# Patient Record
Sex: FEMALE | Race: WHITE
[De-identification: names, ages, dates, MRNs, and addresses within clinical notes are randomized per-mention and may not be internally consistent; named-entity substitution may affect disease eponyms.]

---

## 2019-04-27 ENCOUNTER — HOSPITAL ENCOUNTER (INPATIENT)
Dept: HOSPITAL 26 - MED | Age: 39
LOS: 1 days | Discharge: HOME | DRG: 532 | End: 2019-04-28
Attending: GENERAL PRACTICE | Admitting: GENERAL PRACTICE
Payer: MEDICAID

## 2019-04-27 VITALS — WEIGHT: 208 LBS | HEIGHT: 62 IN | BODY MASS INDEX: 38.28 KG/M2

## 2019-04-27 VITALS — SYSTOLIC BLOOD PRESSURE: 138 MMHG | DIASTOLIC BLOOD PRESSURE: 93 MMHG

## 2019-04-27 VITALS — SYSTOLIC BLOOD PRESSURE: 138 MMHG | DIASTOLIC BLOOD PRESSURE: 83 MMHG

## 2019-04-27 VITALS — SYSTOLIC BLOOD PRESSURE: 144 MMHG | DIASTOLIC BLOOD PRESSURE: 86 MMHG

## 2019-04-27 VITALS — SYSTOLIC BLOOD PRESSURE: 150 MMHG | DIASTOLIC BLOOD PRESSURE: 87 MMHG

## 2019-04-27 VITALS — DIASTOLIC BLOOD PRESSURE: 91 MMHG | SYSTOLIC BLOOD PRESSURE: 148 MMHG

## 2019-04-27 DIAGNOSIS — N92.0: Primary | ICD-10-CM

## 2019-04-27 DIAGNOSIS — E86.1: ICD-10-CM

## 2019-04-27 DIAGNOSIS — N17.0: ICD-10-CM

## 2019-04-27 DIAGNOSIS — D64.9: ICD-10-CM

## 2019-04-27 DIAGNOSIS — J98.11: ICD-10-CM

## 2019-04-27 DIAGNOSIS — I10: ICD-10-CM

## 2019-04-27 DIAGNOSIS — R00.0: ICD-10-CM

## 2019-04-27 DIAGNOSIS — E44.0: ICD-10-CM

## 2019-04-27 LAB
ALBUMIN FLD-MCNC: 3 G/DL (ref 3.4–5)
ANION GAP SERPL CALCULATED.3IONS-SCNC: 11.1 MMOL/L (ref 8–16)
ANION GAP SERPL CALCULATED.3IONS-SCNC: 11.4 MMOL/L (ref 8–16)
APPEARANCE UR: (no result)
AST SERPL-CCNC: 20 U/L (ref 15–37)
BARBITURATES UR QL SCN: (no result) NG/ML
BASOPHILS # BLD AUTO: 0.1 K/UL (ref 0–0.22)
BASOPHILS NFR BLD AUTO: 0.6 % (ref 0–2)
BASOPHILS NFR BLD AUTO: 0.9 % (ref 0–2)
BASOPHILS NFR BLD AUTO: 1 % (ref 0–2)
BENZODIAZ UR QL SCN: (no result) NG/ML
BILIRUB SERPL-MCNC: 0.1 MG/DL (ref 0–1)
BILIRUB UR QL STRIP: NEGATIVE
BUN SERPL-MCNC: 18 MG/DL (ref 7–18)
BUN SERPL-MCNC: 22 MG/DL (ref 7–18)
BZE UR QL SCN: (no result) NG/ML
CANNABINOIDS UR QL SCN: (no result) NG/ML
CHLORIDE SERPL-SCNC: 106 MMOL/L (ref 98–107)
CHLORIDE SERPL-SCNC: 108 MMOL/L (ref 98–107)
CHOLEST/HDLC SERPL: 2.9 {RATIO} (ref 1–4.5)
CO2 SERPL-SCNC: 26.3 MMOL/L (ref 21–32)
CO2 SERPL-SCNC: 26.6 MMOL/L (ref 21–32)
COLOR UR: YELLOW
CREAT SERPL-MCNC: 0.7 MG/DL (ref 0.6–1.3)
CREAT SERPL-MCNC: 1 MG/DL (ref 0.6–1.3)
EOSINOPHIL # BLD AUTO: 0.6 K/UL (ref 0–0.4)
EOSINOPHIL # BLD AUTO: 0.7 K/UL (ref 0–0.4)
EOSINOPHIL # BLD AUTO: 0.8 K/UL (ref 0–0.4)
EOSINOPHIL NFR BLD AUTO: 5.8 % (ref 0–4)
EOSINOPHIL NFR BLD AUTO: 7.6 % (ref 0–4)
EOSINOPHIL NFR BLD AUTO: 7.6 % (ref 0–4)
ERYTHROCYTE [DISTWIDTH] IN BLOOD BY AUTOMATED COUNT: 20.5 % (ref 11.6–13.7)
ERYTHROCYTE [DISTWIDTH] IN BLOOD BY AUTOMATED COUNT: 20.9 % (ref 11.6–13.7)
ERYTHROCYTE [DISTWIDTH] IN BLOOD BY AUTOMATED COUNT: 21.7 % (ref 11.6–13.7)
GFR SERPL CREATININE-BSD FRML MDRD: 120 ML/MIN (ref 90–?)
GFR SERPL CREATININE-BSD FRML MDRD: 79 ML/MIN (ref 90–?)
GLUCOSE SERPL-MCNC: 103 MG/DL (ref 74–106)
GLUCOSE SERPL-MCNC: 108 MG/DL (ref 74–106)
GLUCOSE UR STRIP-MCNC: NEGATIVE MG/DL
HCT VFR BLD AUTO: 20.9 % (ref 36–48)
HCT VFR BLD AUTO: 21.9 % (ref 36–48)
HCT VFR BLD AUTO: 26.3 % (ref 36–48)
HDLC SERPL-MCNC: 42 MG/DL (ref 40–60)
HGB BLD-MCNC: 6.4 G/DL (ref 12–16)
HGB BLD-MCNC: 6.8 G/DL (ref 12–16)
HGB BLD-MCNC: 8.3 G/DL (ref 12–16)
HGB UR QL STRIP: (no result)
IRON SERPL-MCNC: 16 UG/DL (ref 35–150)
LDLC SERPL CALC-MCNC: 65 MG/DL (ref 60–100)
LEUKOCYTE ESTERASE UR QL STRIP: (no result)
LIPASE SERPL-CCNC: 164 U/L (ref 73–393)
LYMPHOCYTES # BLD AUTO: 1.9 K/UL (ref 2.5–16.5)
LYMPHOCYTES # BLD AUTO: 2.3 K/UL (ref 2.5–16.5)
LYMPHOCYTES # BLD AUTO: 2.5 K/UL (ref 2.5–16.5)
LYMPHOCYTES NFR BLD AUTO: 17.7 % (ref 20.5–51.1)
LYMPHOCYTES NFR BLD AUTO: 23.9 % (ref 20.5–51.1)
LYMPHOCYTES NFR BLD AUTO: 26.2 % (ref 20.5–51.1)
MAGNESIUM SERPL-MCNC: 2.2 MG/DL (ref 1.8–2.4)
MCH RBC QN AUTO: 24 PG (ref 27–31)
MCH RBC QN AUTO: 25 PG (ref 27–31)
MCH RBC QN AUTO: 25 PG (ref 27–31)
MCHC RBC AUTO-ENTMCNC: 31 G/DL (ref 33–37)
MCHC RBC AUTO-ENTMCNC: 31 G/DL (ref 33–37)
MCHC RBC AUTO-ENTMCNC: 32 G/DL (ref 33–37)
MCV RBC AUTO: 78.2 FL (ref 80–94)
MCV RBC AUTO: 80.3 FL (ref 80–94)
MCV RBC AUTO: 80.4 FL (ref 80–94)
MONOCYTES # BLD AUTO: 0.8 K/UL (ref 0.8–1)
MONOCYTES # BLD AUTO: 0.9 K/UL (ref 0.8–1)
MONOCYTES # BLD AUTO: 0.9 K/UL (ref 0.8–1)
MONOCYTES NFR BLD AUTO: 8.6 % (ref 1.7–9.3)
MONOCYTES NFR BLD AUTO: 8.6 % (ref 1.7–9.3)
MONOCYTES NFR BLD AUTO: 9.6 % (ref 1.7–9.3)
NEUTROPHILS # BLD AUTO: 4.8 K/UL (ref 1.8–7.7)
NEUTROPHILS # BLD AUTO: 6.3 K/UL (ref 1.8–7.7)
NEUTROPHILS # BLD AUTO: 6.9 K/UL (ref 1.8–7.7)
NEUTROPHILS NFR BLD AUTO: 56 % (ref 42.2–75.2)
NEUTROPHILS NFR BLD AUTO: 60.8 % (ref 42.2–75.2)
NEUTROPHILS NFR BLD AUTO: 65.1 % (ref 42.2–75.2)
NITRITE UR QL STRIP: NEGATIVE
OPIATES UR QL SCN: (no result) NG/ML
PCP UR QL SCN: (no result) NG/ML
PH UR STRIP: 5.5 [PH] (ref 5–9)
PHOSPHATE SERPL-MCNC: 3.5 MG/DL (ref 2.5–4.9)
PLATELET # BLD AUTO: 352 K/UL (ref 140–450)
PLATELET # BLD AUTO: 372 K/UL (ref 140–450)
PLATELET # BLD AUTO: 376 K/UL (ref 140–450)
POTASSIUM SERPL-SCNC: 4 MMOL/L (ref 3.5–5.1)
POTASSIUM SERPL-SCNC: 4.4 MMOL/L (ref 3.5–5.1)
PROTHROMBIN TIME: 9.4 SECS (ref 10.8–13.4)
RBC # BLD AUTO: 2.68 MIL/UL (ref 4.2–5.4)
RBC # BLD AUTO: 2.73 MIL/UL (ref 4.2–5.4)
RBC # BLD AUTO: 3.28 MIL/UL (ref 4.2–5.4)
RBC #/AREA URNS HPF: (no result) /HPF (ref 0–5)
SODIUM SERPL-SCNC: 139 MMOL/L (ref 136–145)
SODIUM SERPL-SCNC: 142 MMOL/L (ref 136–145)
T4 FREE SERPL-MCNC: 0.98 NG/DL (ref 0.76–1.46)
TIBC SERPL-MCNC: 409 UG/DL (ref 250–450)
TRIGL SERPL-MCNC: 73 MG/DL (ref 30–150)
TSH SERPL DL<=0.05 MIU/L-ACNC: 3.15 UIU/ML (ref 0.34–3.74)
WBC # BLD AUTO: 10.3 K/UL (ref 4.8–10.8)
WBC # BLD AUTO: 10.6 K/UL (ref 4.8–10.8)
WBC # BLD AUTO: 8.6 K/UL (ref 4.8–10.8)
WBC,URINE: (no result) /HPF (ref 0–5)

## 2019-04-27 PROCEDURE — 30233N1 TRANSFUSION OF NONAUTOLOGOUS RED BLOOD CELLS INTO PERIPHERAL VEIN, PERCUTANEOUS APPROACH: ICD-10-PCS

## 2019-04-27 PROCEDURE — P9016 RBC LEUKOCYTES REDUCED: HCPCS

## 2019-04-27 RX ADMIN — CYCLOBENZAPRINE HYDROCHLORIDE SCH MG: 10 TABLET, FILM COATED ORAL at 13:55

## 2019-04-27 RX ADMIN — DOCUSATE SODIUM SCH MG: 100 CAPSULE, LIQUID FILLED ORAL at 08:38

## 2019-04-27 RX ADMIN — KETOROLAC TROMETHAMINE PRN MG: 30 INJECTION, SOLUTION INTRAMUSCULAR; INTRAVENOUS at 20:56

## 2019-04-27 RX ADMIN — OXYCODONE HYDROCHLORIDE AND ACETAMINOPHEN SCH MG: 500 TABLET ORAL at 08:38

## 2019-04-27 RX ADMIN — PSYLLIUM HUSK SCH GM: 3.4 POWDER ORAL at 10:01

## 2019-04-27 RX ADMIN — SODIUM CHLORIDE SCH MLS/HR: 9 INJECTION, SOLUTION INTRAVENOUS at 05:04

## 2019-04-27 RX ADMIN — CYCLOBENZAPRINE HYDROCHLORIDE SCH MG: 10 TABLET, FILM COATED ORAL at 17:39

## 2019-04-27 NOTE — NUR
BLOOD COMPLETED. BLOOD AND NS BAG PUT IN BIOHAZARD BAG AND THROWN IN BIOHAZARD CONTAINER. PT 
HAS NO ADVERSE REACTIONS TO COMPLETION OF BLOOD. V/S ALONG BASELINE OF PT NORMAL V/S. BP 
137/83, , O2 SAT 97, TEMPERATURE 98.1, AND RR 18. 0/10 PAIN.

## 2019-04-27 NOTE — NUR
PT UP AT BEDSIDE USING COMMODE, URINE IS CLEAR/YELLOW, NORMAL ODOR, 400ML OUTPUT. DENIES 
CONSTIPATION.

## 2019-04-27 NOTE — NUR
Patient will be admitted to care of DR BERRY. Admited to KRISTI.  Will go to 
room ICU-02. Belongings list completed.  Report to MARJORIE KINNEY.

## 2019-04-27 NOTE — NUR
BREAKFAST TRAY PROVIDED, PATIENT'S FRIEND AT BEDSIDE, NO SIGNS OF DISTRESS NOTED. 1 UNIT OF 
PRBC STILL TRANSFUSING, NO SIGNS OF REACTION NOTED, PATIENT TOLERATING WELL.

## 2019-04-27 NOTE — NUR
PATIENT EDUCATION PROVIDED REGARDING BLOOD TRANSFUSION AND CONSENT FORM HAS BEEN SIGNED. 
RUSSEL AT BEDSIDE TO COLLECT BLOOD SAMPLE FORM PATIENT. PT IS CALM AND COOPERATIVE.

## 2019-04-27 NOTE — NUR
VITAL SIGNS TAKEN AGAIN 15 MINUTES AFTER STARTING BLOOD TRANSFUSION. NO SIGNS OF DISTRESS 
NOTED. PT DENIES SOB, ITCHING, OR CHEST PAIN. WILL CONTINUE TO MONITOR.

## 2019-04-27 NOTE — NUR
REPORT RECEIVED FROM AM NURSE AT BEDSIDE. PT IN STABLE CONDITION. INTRODUCED SELF TO PT. 
BOARD UPDATED. AAOX4. NO COMPLAINTS OF PAIN. NO SOB. AFEBRILE. PT CURRENTLY RECEIVING BLOOD 
FOR HGB OF 6.8 ESTIMATED TIME OF COMPLETION 1955. IV SITE R AC 20G CURRENTLY RUNNING BLOOD 
BUT ALSO HAS NS@60ML/HR PATENT AND INTACT. SKIN WARM, DRY, AND INTACT WITH NO OPEN WOUNDS. 
PT IS AMBULATORY. HAS AN ORDER FOR OCCULT BLOOD. PT HAS NOT HAD A BM YET. BED LOCKED IN LOW 
POSITION. CALL BELL WITHIN REACH. SAFETY PRECAUTIONS IN PLACE. ALL NEEDS MET AT THIS TIME.

## 2019-04-27 NOTE — NUR
INFUSING STARTED AT 0610, VSS, PT DENIES ANY PAIN. ICE AND WATER PROVIDED PER PT REQUEST. PT 
IS RELAXED TALKING ON PHONE WITH VISITOR AT BEDSIDE, NO SIGNS OF DISTRESS, INFUSING BLOOD TO 
RIGHT AC PIV AT 100CC/HR.

## 2019-04-27 NOTE — NUR
ADMINISTERED SCHEDULED MEDICATIONS, PT TOLERATED WELL. NO OTHER NEEDS AT THIS TIME. FAMILY 
AT THE BEDSIDE.

## 2019-04-27 NOTE — NUR
RECEIVED BEDSIDE REPORT FROM NIGHT SHIFT RNGREGORIA, FOR CONTINUITY OF CARE. PATIENT IS 
AAOX4, ABLE TO MAKE NEEDS KNOWN AND FOLLOWS COMMANDS, PATIENT IS Yakut SPEAKING BUT ABLE 
TO UNDERSTAND SIMPLE ENGLISH. PATIENT SKIN IS WARM, DRY, INTACT. SHE HAS PERIPHERAL IV SITE 
TO RFA, 18 GAUGE, ASYMPTOMATIC AND PATENT. PATIENT IS ON ROOM AIR, BREATHING EVEN AND 
UNLABORED. SR ON MONITOR, NO EDEMA NOTED. PATIENT DENIES ANY PAIN, N, V AT THIS TIME. HOB IS 
SEMI-PABLO, BED LOCKED, SAFETY PRECAUTIONS AND ALARMS ASSESSED AND ENFORCED. NO SIGNS OF 
DISTRESS NOTED. WILL CONTINUE TO MONITOR

-------------------------------------------------------------------------------

Addendum: 04/27/19 at 0800 by Jesus Mckeon RN

-------------------------------------------------------------------------------

PATIENT HAS 1 UNIT OF BLOOD RUNNING THROUGH PERIPHERAL IV SITE. NO SIGNS OF REACTION NOTED, 
PATIENT AFEBRILE, VITALS STABLE.

## 2019-04-27 NOTE — NUR
RECEIVED BEDSIDE REPORT FROM ICU RN SABAS. PT STABLE, AWAKE, ALERT AND ORIENTED X4. NO SIGNS 
OF DISTRESS NOTED. DENIES PAIN OR SOB. PT AMBULATED FROM THE WHEELCHAIR TO THE BED WITH 
STEADY GAIT. FAMILY AT THE BEDSIDE. NO REDNESS, SWELLING, OR INFLAMMATION NOTED ON IV SITE. 
BED IN LOWEST POSITION. CALL BELL WITHIN REACH. SAFETY MEASURES IN PLACE. PLAN OF CARE 
REVIEWED.

## 2019-04-27 NOTE — NUR
TRANSFERRED PATIENT TO [facility] VIA BED. PATIENT WAS HOOKED TO CARDIAC MONITOR, NO SIGNS OF 
DISTRESS NOTED. ENDORSED CONTINUITY OF CARE TO [facility] RNMAURA.

## 2019-04-27 NOTE — NUR
PT SLEEPING COMFORTABLY IN BED SUPINE. NO S/S OF DISTRESS NOTED. NO COMPLAINTS OF PAIN. NO 
SOB. AFEBRILE. WILL CONTINUE TO MONITOR.

## 2019-04-27 NOTE — NUR
PT PRESENTS TO ED WITH C/O RT FLANK PAIN X 3 DAYS. PT REPORTS N/V WITH PAIN X 1 
DAY. PT DENIES ANY DYSURIA OR DIFFICULTY URINATING. PT PLACED INTO BED, PENDING 
MD WATKINS.

## 2019-04-27 NOTE — NUR
PT ARRIVE IN ICU, DR. FELIX AT BEDSIDE TO SEE PATIENT

AAOX4, Cuban SPEAKING, VSS /83, SATING 99%, RR=18, HR=98. ON ROOM AIR, SR ON CARDIAC 
MONITOR. BOWEL SOUNDS ACTIVE, NO GONSALEZ IN PLACE. STANDARD PRECAUTIONS MAINTAINED. PT ABLE TO 
AMBULATE FROM GURNEY TO ICU BED. FAMILY MEMBER AT BEDSIDE. 

DR. FELIX AT BEDSIDE TO SEE PATIENT.

## 2019-04-28 VITALS — SYSTOLIC BLOOD PRESSURE: 137 MMHG | DIASTOLIC BLOOD PRESSURE: 83 MMHG

## 2019-04-28 VITALS — SYSTOLIC BLOOD PRESSURE: 139 MMHG | DIASTOLIC BLOOD PRESSURE: 83 MMHG

## 2019-04-28 VITALS — SYSTOLIC BLOOD PRESSURE: 150 MMHG | DIASTOLIC BLOOD PRESSURE: 70 MMHG

## 2019-04-28 LAB
ANION GAP SERPL CALCULATED.3IONS-SCNC: 10.9 MMOL/L (ref 8–16)
BASOPHILS # BLD AUTO: 0.1 K/UL (ref 0–0.22)
BASOPHILS NFR BLD AUTO: 0.8 % (ref 0–2)
BUN SERPL-MCNC: 12 MG/DL (ref 7–18)
CHLORIDE SERPL-SCNC: 106 MMOL/L (ref 98–107)
CO2 SERPL-SCNC: 27.4 MMOL/L (ref 21–32)
CREAT SERPL-MCNC: 0.6 MG/DL (ref 0.6–1.3)
EOSINOPHIL # BLD AUTO: 0.5 K/UL (ref 0–0.4)
EOSINOPHIL NFR BLD AUTO: 6.5 % (ref 0–4)
ERYTHROCYTE [DISTWIDTH] IN BLOOD BY AUTOMATED COUNT: 20.5 % (ref 11.6–13.7)
GFR SERPL CREATININE-BSD FRML MDRD: 143 ML/MIN (ref 90–?)
GLUCOSE SERPL-MCNC: 96 MG/DL (ref 74–106)
HCT VFR BLD AUTO: 26.6 % (ref 36–48)
HGB BLD-MCNC: 8.5 G/DL (ref 12–16)
LYMPHOCYTES # BLD AUTO: 1.7 K/UL (ref 2.5–16.5)
LYMPHOCYTES NFR BLD AUTO: 20 % (ref 20.5–51.1)
MCH RBC QN AUTO: 26 PG (ref 27–31)
MCHC RBC AUTO-ENTMCNC: 32 G/DL (ref 33–37)
MCV RBC AUTO: 80.2 FL (ref 80–94)
MONOCYTES # BLD AUTO: 0.8 K/UL (ref 0.8–1)
MONOCYTES NFR BLD AUTO: 9.2 % (ref 1.7–9.3)
NEUTROPHILS # BLD AUTO: 5.3 K/UL (ref 1.8–7.7)
NEUTROPHILS NFR BLD AUTO: 63.5 % (ref 42.2–75.2)
PLATELET # BLD AUTO: 391 K/UL (ref 140–450)
POTASSIUM SERPL-SCNC: 4.3 MMOL/L (ref 3.5–5.1)
RBC # BLD AUTO: 3.32 MIL/UL (ref 4.2–5.4)
SODIUM SERPL-SCNC: 140 MMOL/L (ref 136–145)
WBC # BLD AUTO: 8.4 K/UL (ref 4.8–10.8)

## 2019-04-28 RX ADMIN — OXYCODONE HYDROCHLORIDE AND ACETAMINOPHEN SCH MG: 500 TABLET ORAL at 09:05

## 2019-04-28 RX ADMIN — FERROUS SULFATE TAB 325 MG (65 MG ELEMENTAL FE) SCH MG: 325 (65 FE) TAB at 16:22

## 2019-04-28 RX ADMIN — CYCLOBENZAPRINE HYDROCHLORIDE SCH MG: 10 TABLET, FILM COATED ORAL at 09:06

## 2019-04-28 RX ADMIN — DOCUSATE SODIUM SCH MG: 100 CAPSULE, LIQUID FILLED ORAL at 09:05

## 2019-04-28 RX ADMIN — KETOROLAC TROMETHAMINE PRN MG: 30 INJECTION, SOLUTION INTRAMUSCULAR; INTRAVENOUS at 04:32

## 2019-04-28 RX ADMIN — FERROUS SULFATE TAB 325 MG (65 MG ELEMENTAL FE) SCH MG: 325 (65 FE) TAB at 09:06

## 2019-04-28 RX ADMIN — PSYLLIUM HUSK SCH GM: 3.4 POWDER ORAL at 09:07

## 2019-04-28 RX ADMIN — SODIUM CHLORIDE SCH MLS/HR: 9 INJECTION, SOLUTION INTRAVENOUS at 03:43

## 2019-04-28 NOTE — NUR
PT SLEEPING COMFORTABLY IN BED RIGHT LATERAL. NO S/S OF DISTRESS NOTED. BREATHING EVEN, 
UNLABORED, AND WNL. WILL CONTINUE TO MONITOR.

## 2019-04-28 NOTE — NUR
PT DISCHARGED HOME FOR SELF CARE. PT DISCHARGE TEACHING DONE. PT VERBALIZED UNDERSTANDING OF 
TEACHING. ALL DISCHARGE PAPERWORK WAS SIGNED. IV REMOVED WITH TIP INTACT. WRIST BANDS 
REMOVED AND PLACED IN SHRED BIN. ALL PERSONAL BELONGING TAKEN WITH PT. PT WAS WHEELED OUT IN 
TO FAMILY AWAITING AT [facility].

## 2019-04-28 NOTE — NUR
PT SLEEPING COMFORTABLY. NO S/S OF DISTRESS NOTED. NO COMPLAINTS OF PAIN. NO SOB. AFEBRILE. 
RESPIRATIONS EVEN, UNLABORED, AND WNL. WILL CONTINUE TO MONITOR.